# Patient Record
Sex: MALE | Race: WHITE | URBAN - METROPOLITAN AREA
[De-identification: names, ages, dates, MRNs, and addresses within clinical notes are randomized per-mention and may not be internally consistent; named-entity substitution may affect disease eponyms.]

---

## 2020-07-08 ENCOUNTER — APPOINTMENT (OUTPATIENT)
Dept: PHYSICAL THERAPY | Age: 25
End: 2020-07-08
Payer: COMMERCIAL

## 2020-07-22 ENCOUNTER — HOSPITAL ENCOUNTER (OUTPATIENT)
Dept: PHYSICAL THERAPY | Age: 25
Discharge: HOME | End: 2020-07-22
Payer: COMMERCIAL

## 2020-07-22 PROCEDURE — 97530 THERAPEUTIC ACTIVITIES: CPT

## 2020-07-22 PROCEDURE — 97161 PT EVAL LOW COMPLEX 20 MIN: CPT

## 2020-07-22 PROCEDURE — 97110 THERAPEUTIC EXERCISES: CPT

## 2020-07-22 NOTE — PROGRESS NOTES
[facility] [address], [place], [address] [facility]  Phone: [phone]      Fax:  [phone]       Plan of Care/ Statement of Necessity for Physical Therapy Services      Patient name: Whit Villanueva Start of Care: 2020   Referral source: Referred, Self, MD : 1995    Medical Diagnosis: Low back pain [M54.5]   Onset Date:1-2 years ago    Treatment Diagnosis: Thoracic pain    Prior Hospitalization: see medical history Provider#: [id number]   Medications: Verified on Patient summary List    Comorbidities: none as per pt    Prior Level of Function: Increased in pain with prolonged sitting as with work, increased exercise (>3 days per week), overhead lifts      The Plan of Care and following information is based on the information from the initial evaluation. Assessment/ key information:   Pt is a pleasant and motivated 26 y.o. male with C/C of back pain, primarily in thoracic area. Denies any red flags or radicular sx at this time. Pt reports pain insidious in onset, intermittent but ongoing over last 1-2 years. Pt reports getting to a point where decided to address it. Signs/SYmptoms consistent with mechanical back pain. Objective Findings include postural and gait adaptations, decreased ROM, poor oblique, mild scapular dyskinesia, serratus and trunk support and glut and hamstring inhibition. Pt reports most challenged with prolonged sitting as is required for work. Evaluation Complexity History LOW Complexity : Zero comorbidities / personal factors that will impact the outcome / POC; Examination HIGH Complexity : 4+ Standardized tests and measures addressing body structure, function, activity limitation and / or participation in recreation  ;Presentation MEDIUM Complexity : Evolving with changing characteristics  ; Clinical Decision Making MEDIUM Complexity : FOTO score of 26-74  Overall Complexity Rating: LOW   Problem List: pain affecting function, decrease ROM, decrease strength, impaired gait/ balance, decrease ADL/ functional abilitiies, decrease activity tolerance and decrease flexibility/ joint mobility   Treatment Plan may include any combination of the following: Therapeutic exercise, Therapeutic activities, Neuromuscular re-education, Physical agent/modality, Gait/balance training, Manual therapy and Patient education  Patient / Family readiness to learn indicated by: asking questions, trying to perform skills and interest  Persons(s) to be included in education: patient (P)  Barriers to Learning/Limitations: None  Patient Goal (s): To figure out some ways to make my back feel better. I want to be able to work out more.   Patient Self Reported Health Status: good  Rehabilitation Potential: good    Short Term Goals: STG- To be accomplished in 5 visit(s):  1. Pt will be independent with HEP to encourage prophylaxis. Eval:dispensed      Long Term Goals: LTG- To be accomplished in 10 visit(s):  1. Pt will demonstrate ability to forward flex to ground and return to standing with good mechanics and without pain to increase tolerance to daily activities. .  Eval:12 in from ground, right lateralization     2. Pt will improve trunk rotation to 15 in bilaterally in hooklying to indicate mobility needed for gait. Eval:right 19 in            Left: 17 in      3. Pt will report being able to sit at work and work a full day without being disturbed by back pain  Eval:pain frequently during day, reviewed sitting postures at eval      4. Pt will be able to lift overhead with good mechanics and without increased thoracic pain to allow participation in daily and recreational activities. Eval:pain in thoracic spine with MMT of shoulder abduction and scaption     Frequency / Duration: Patient to be seen 10 treatments.     Patient/ Caregiver education and instruction: Diagnosis, prognosis, self care, activity modification and exercises   [x]  Plan of care has been reviewed with LAURA Krueger, PT, DPT 7/22/2020 3:53 PM  _____________________________________________________________________  I certify that the above Therapy Services are being furnished while the patient is under my care. I agree with the treatment plan and certify that this therapy is necessary.     Physician's Signature:____________Date:_________TIME:________    [employer], Date and Time must be completed for valid certification **    Please sign and return to [facility] at the [address], [place], [address] [facility]       Phone: [phone]      Fax:  [phone]

## 2020-07-22 NOTE — PROGRESS NOTES
PT DAILY TREATMENT NOTE    Patient Name: Shola Durbin  Date:2020  : 1995  [x]  Patient  Verified  Payor: [employer] / Plan: Estela Ceja [id number] PPO / Product Type: PPO /    In time:2:40 pm   Out time:3:35 pm   Total Treatment Time (min): 55  Total Timed Codes (min): 30  1:1 Treatment Time ( only): 54   Visit #: 1 of 10    Treatment Area: Low back pain [M54.5]    SUBJECTIVE  Pain Level (0-10 scale): 1-2  Any medication changes, allergies to medications, adverse drug reactions, diagnosis change, or new procedure performed?: [x] No    [] Yes (see summary sheet for update)  Subjective functional status/changes:   [] No changes reported    Hx Present Illness: \"A lot of weirdness in my back for several years. I feel imbalanced. A lot of cracking\"  Denies red flags and bowel and bladder involvement   Increased in pain with prolonged sitting as with work, increased exercise (>3 days per week), overhead lifts   Onset insidious 1-2 years, mild progression of sx    Pain:  _5__/10 max       __0_/10 min     _1-2___/10 now    Location: indicates between shoulder blades and left lower thoracic area as location of pain      [] Sharp    [x] Dull      [] Burning     []  Aching     [] Throbbing      [] Tingling     [x] Other: \"dull,mild, stiff. \"      []  Constant                   [x] Intermittent        Previous treatment:   None     PMHX: PMHx/Surgical Hx:  unremarkable as per pt     Social/Recreation/Work: Work Hx:  for the [employer] - increased sitting and computer use   Living Situation: lives with roommate  Recreational Activities: walking/jogging, drawing, CrossFit (3-5 days a week)     Patient Goal(s): \"To figure out some ways to make my back feel better. I want to be able to work out more. \"    Cognition: A & O x 4      OBJECTIVE    Modalities Rationale:   min [] Estim, type/location:                                      []  att     []  unatt     []  w/US     []  w/ice    []  w/heat    min [] Mechanical Traction: type/lbs                   []  pro   []  sup   []  int   []  cont    []  before manual    []  after manual    min []  Ultrasound, settings/location:      min []  Iontophoresis w/ dexamethasone, location:                                               []  take home patch       []  in clinic    min []  Ice     []  Heat    location/position:     min []  Vasopneumatic Device, press/temp:     min []  Other:    [] Skin assessment post-treatment (if applicable):    []  intact    []  redness- no adverse reaction     []redness  adverse reaction:        25 min [x]Eval                  []Re-Eval       10 min Therapeutic Exercise:  [x] See flow sheet :   Rationale: increase ROM, increase strength, improve coordination and increase proprioception to improve the patients ability to perform daily activities with decreased pain and symptom levels          With   [] TE   [x] TA - 20 min    [] neuro   [] other: Patient Education: [x] Review HEP    [] Progressed/Changed HEP based on:   [] positioning   [] body mechanics   [] transfers   [] heat/ice application    [x] other: pt education regarding exam findings, anatomy involved, MARY approach and purpose, anatomy involved,sitting postures at work     Other Objective/Functional Measures:    Movement/gait:  Glut inhibition, decreased trunk and pelvic rotation, right shoulder lower, decreased arm swing    Visual Inspection: Thoracic: flattened  Lumbar: increased   Shoulder/Scapula: abducted bilaterally, IR, inferior angle tipped bilaterally     Palpation: increased tone in thoracic and lumbar paraspinals                            AROM/PROM Right Left   Standing EXT: WFL     Trunk Rot (hooklying) 19 in  17 in                          Strength Right Left   UQS  Had pinching in back with MMT of abdandscaption  4+ 4+                                Special Tests Right Left   Slump - -   SLR - -   VAL - -   Gaenslen's - -               Other Right Left Other Comments: Standing Forward Flexion 12 in from floor  Gillet: hypomobile on the Left   Scapular Rhythm: mild dyskinesia     MARY Special Tests (pre/post)  HGIR:                                                 Right: 90             Left: 90  Shoulder Flexion   Right: WFL             Left: [facility]  Hip Add Drop Test:                           Right: midline            Left:+  Trunk Rot                                           Right: 19 in  Left 17 in    Shoulder Horizontal Abduction          Right: 45              Left: 21      Apical Ext Test:                                   Right: decreased  Left: decreased  Extension Drop                                   Right : -          Left: -    Pain Level (0-10 scale) post treatment: 0    ASSESSMENT/Changes in Function:   Pt is a pleasant and motivated 26 y.o. male with C/C of back pain, primarily in thoracic area. Denies any red flags or radicular sx at this time. Pt reports pain insidious in onset, intermittent but ongoing over last 1-2 years. Pt reports getting to a point where decided to address it. Signs/SYmptoms consistent with mechanical back pain. Objective Findings include postural and gait adaptations, decreased ROM, poor oblique, mild scapular dyskinesia, serratus and trunk support and glut and hamstring inhibition. Pt reports most challenged with prolonged sitting as is required for work. Patient will continue to benefit from skilled PT services to modify and progress therapeutic interventions, address functional mobility deficits, address ROM deficits, address strength deficits, analyze and address soft tissue restrictions, analyze and cue movement patterns, analyze and modify body mechanics/ergonomics, assess and modify postural abnormalities and instruct in home and community integration to attain remaining goals.      [x]  See Plan of Care  []  See progress note/recertification  []  See Discharge Summary         Progress towards goals / Updated goals:  Short Term Goals: STG- To be accomplished in 5 visit(s):  1. Pt will be independent with HEP to encourage prophylaxis. Eval:dispensed     Long Term Goals: LTG- To be accomplished in 10 visit(s):  1. Pt will demonstrate ability to forward flex to ground and return to standing with good mechanics and without pain to increase tolerance to daily activities. .  Eval:12 in from ground, right lateralization    2. Pt will improve trunk rotation to 15 in bilaterally in hooklying to indicate mobility needed for gait. Eval:right 19 in  Left: 17 in     3. Pt will report being able to sit at work and work a full day without being disturbed by back pain  Eval:pain frequently during day, reviewed sitting postures at eval     4. Pt will be able to lift overhead with good mechanics and without increased thoracic pain to allow participation in daily and recreational activities. Eval:pain in thoracic spine with MMT of shoulder abduction and scaption       PLAN  [x]  Upgrade activities as tolerated     []  Continue plan of care  []  Update interventions per flow sheet       []  Discharge due to:_  []  Other:_      Deirdre Mcdonald PT, DPT 7/22/2020  2:43 PM    No future appointments.

## 2020-07-29 ENCOUNTER — HOSPITAL ENCOUNTER (OUTPATIENT)
Dept: PHYSICAL THERAPY | Age: 25
Discharge: HOME | End: 2020-07-29
Payer: COMMERCIAL

## 2020-07-29 PROCEDURE — 97140 MANUAL THERAPY 1/> REGIONS: CPT

## 2020-07-29 PROCEDURE — 97110 THERAPEUTIC EXERCISES: CPT

## 2020-07-29 PROCEDURE — 97112 NEUROMUSCULAR REEDUCATION: CPT

## 2020-07-29 PROCEDURE — 97530 THERAPEUTIC ACTIVITIES: CPT

## 2020-07-29 NOTE — PROGRESS NOTES
PT DAILY TREATMENT NOTE    Patient Name: Brit Villegas  Date:2020  : 1995  [x]  Patient  Verified  Payor: BLUE [employer]OSS / Plan: Estela Ceja [id number] PPO / Product Type: PPO /    In time: 1:49 pm  Out time:2:40 pm   Total Treatment Time (min): 51  Total Timed Codes (min): 51  1:1 Treatment Time ( only): 51   Visit #: 2 of 10    Treatment Area: Low back pain [M54.5]    SUBJECTIVE  Pain Level (0-10 scale): 0  Any medication changes, allergies to medications, adverse drug reactions, diagnosis change, or new procedure performed?: [x] No    [] Yes (see summary sheet for update)  Subjective functional status/changes:   [] No changes reported  \"I am happy to be here today. \"  Reported minimal to no pain since eval, compliance with HEP. Also stated increased cracking at back but no pain.      OBJECTIVE    Modalities Rationale:        min [] Estim, type/location:                                      []  att     []  unatt     []  w/US     []  w/ice    []  w/heat    min []  Mechanical Traction: type/lbs                   []  pro   []  sup   []  int   []  cont    []  before manual    []  after manual    min []  Ultrasound, settings/location:      min []  Iontophoresis w/ dexamethasone, location:                                               []  take home patch       []  in clinic    min []  Ice     []  Heat    location/position:     min []  Vasopneumatic Device, press/temp:     min []  Other:    [] Skin assessment post-treatment (if applicable):    []  intact    []  redness- no adverse reaction     []redness  adverse reaction:            15 min Therapeutic Activity:  [x]  See flow sheet :   Rationale: increase ROM, increase strength, improve coordination and increase proprioception  to improve the patients ability to perform daily activities with decreased pain and symptom levels     28 min Neuromuscular Re-education:  [x]  See flow sheet :   Rationale: increase ROM, increase strength, improve coordination and increase proprioception  to improve the patients ability to perform daily activities with decreased pain and symptom levels      8 min Manual Therapy:  Manual left posterior hip capsule stretch  Manual over pressure for obturator inhibition in left adductor pull back position. Rationale: decrease pain, increase ROM, increase tissue extensibility and increase postural awareness to improve the patients ability to perform daily activities with decreased pain and symptom levels            With   [] TE   [] TA   [] neuro   [] other: Patient Education: [x] Review HEP    [] Progressed/Changed HEP based on:   [] positioning   [] body mechanics   [] transfers   [] heat/ice application    [] other:      Other Objective/Functional Measures:   MARY Special Tests (pre/post)  HGIR:                                                 Right: 90/90             Left: 90/90  Hip Add Drop Test:                           Right: +/midline           Left:+/midline   Trunk Rot                                           Right: 17 in/16 in Left 17 in /15 in  Shoulder Horizontal Abduction          Right: [facility]             Left: 29 /36          Pain Level (0-10 scale) post treatment: 0    ASSESSMENT/Changes in Function:   Pt reports feeling overall decreased pain since evaluation. Has worked out twice since then with no pain. Reported relief with driving when performing that anterior right hip shift. Very challenged with adductor pull back and left pec inhibition. Need to address left abs and work on left posterior hip capsule inhibition.     Patient will continue to benefit from skilled PT services to modify and progress therapeutic interventions, address functional mobility deficits, address ROM deficits, address strength deficits, analyze and address soft tissue restrictions, analyze and cue movement patterns, analyze and modify body mechanics/ergonomics, assess and modify postural abnormalities and instruct in home and community integration to attain remaining goals. []  See Plan of Care  []  See progress note/recertification  []  See Discharge Summary         Progress towards goals / Updated goals:  1.  Pt will be independent with HEP to encourage prophylaxis. Eval:dispensed  Current: Progressing 7/29/20 reports compliance and reviewed with pt this session       Long Term Goals: LTG- To be accomplished in 10 visit(s):  1.  Pt will demonstrate ability to forward flex to ground and return to standing with good mechanics and without pain to increase tolerance to daily activities. .  Eval:12 in from ground, right lateralization     2.  Pt will improve trunk rotation to 15 in bilaterally in hooklying to indicate mobility needed for gait. Eval:right 19 in            Left: 17 in   Progressing: improved at end of session Right: 16 in and left 15 in      3.  Pt will report being able to sit at work and work a full day without being disturbed by back pain  Eval:pain frequently during day, reviewed sitting postures at eval      4.  Pt will be able to lift overhead with good mechanics and without increased thoracic pain to allow participation in daily and recreational activities.   Eval:pain in thoracic spine with MMT of shoulder abduction and scaption        PLAN  [x]  Upgrade activities as tolerated     []  Continue plan of care  []  Update interventions per flow sheet       []  Discharge due to:_  []  Other:_      Lashanda Cortes, PT, DPT 7/29/2020  1:54 PM    Future Appointments   Date Time Provider [place]   7/31/2020 10:15 AM Monique James PTA [facility]   8/3/2020  4:00 PM Shelly Morton PT, DPT [facility]   8/5/2020  5:30 PM [facility] James [facility]   8/10/2020  4:00 PM Shelly Morton PT, DPT [facility]   8/12/2020  5:30 PM Monique James PTA [facility]   8/17/2020  4:00 PM Shelly Morton, PT, DPT [facility]   8/19/2020  5:30 PM Monique James PTA [facility]   8/24/2020  4:00 PM Shelly Morton, PT, DPT [facility] 8/26/2020  5:30 PM Shahzad POLLARD [facility]   8/31/2020  4:00 PM Stephanie Lima PT, DPT [facility]

## 2020-07-31 ENCOUNTER — HOSPITAL ENCOUNTER (OUTPATIENT)
Dept: PHYSICAL THERAPY | Age: 25
Discharge: HOME | End: 2020-07-31
Payer: COMMERCIAL

## 2020-07-31 PROCEDURE — 97530 THERAPEUTIC ACTIVITIES: CPT

## 2020-07-31 PROCEDURE — 97112 NEUROMUSCULAR REEDUCATION: CPT

## 2020-07-31 PROCEDURE — 97140 MANUAL THERAPY 1/> REGIONS: CPT

## 2020-07-31 NOTE — PROGRESS NOTES
PT DAILY TREATMENT NOTE    Patient Name: Shola Durbin  Date:2020  : 1995  [x]  Patient  Verified  Payor: [employer]UE [employer] / Plan: Estela Ceja [id number] PPO / Product Type: PPO /    In time:10:26 am  Out time:11:15 am  Total Treatment Time (min): 49  Total Timed Codes (min): 49  1:1 Treatment Time ( only): 52   Visit #: 3 of 10    Treatment Area: Low back pain [M54.5]    SUBJECTIVE  Pain Level (0-10 scale): 3  Any medication changes, allergies to medications, adverse drug reactions, diagnosis change, or new procedure performed?: [x] No    [] Yes (see summary sheet for update)  Subjective functional status/changes:   [] No changes reported  \"I ok. A little sore through here (indicated mid back). \"    OBJECTIVE    Modalities Rationale:      min [] Estim, type/location:                                      []  att     []  unatt     []  w/US     []  w/ice    []  w/heat    min []  Mechanical Traction: type/lbs                   []  pro   []  sup   []  int   []  cont    []  before manual    []  after manual    min []  Ultrasound, settings/location:      min []  Iontophoresis w/ dexamethasone, location:                                               []  take home patch       []  in clinic    min []  Ice     []  Heat    location/position:     min []  Vasopneumatic Device, press/temp:     min []  Other:    [] Skin assessment post-treatment (if applicable):    []  intact    []  redness- no adverse reaction     []redness  adverse reaction:          10 min Therapeutic Activity:  [x]  See flow sheet :   Rationale: increase ROM, increase strength, improve coordination and increase proprioception  to improve the patients ability to perform daily activities with decreased pain and symptom levels       24 min Neuromuscular Re-education:  [x]  See flow sheet : max cues for hamstring and oblique faciliation    Rationale: increase ROM, increase strength, improve coordination and increase proprioception  to improve the patients ability to perform daily activities with decreased pain and symptom levels    15 min Manual Therapy:  MARY AIC correction, Sternal mobilization with active pelvic tilt, Superior T4 (MARY technique), Right subclavius release (MARY technique), MARY infraclavicular rib pump with active breath   Obtained consent for hand placement      Rationale: decrease pain, increase ROM and increase tissue extensibility to improve the patients ability to perform daily activities with decreased pain and symptom levels            With   [] TE   [] TA   [] neuro   [] other: Patient Education: [x] Review HEP    [] Progressed/Changed HEP based on:   [] positioning   [] body mechanics   [] transfers   [] heat/ice application    [] other:      Other Objective/Functional Measures:   MARY Special Tests (pre/post)  HGIR:                                                 Right: 85/90             Left: 90/90  Hip Add Drop Test:                           Right: +/-            Left:+/-  Trunk Rot                                           Right: 16 in/15 in  Left 16 in / 15 in   Shoulder Horizontal Abduction          Right: [facility] /NT            Left: 30 /NT    Standing Forward Flexion: to toes       Pain Level (0-10 scale) post treatment: 0    ASSESSMENT/Changes in Function:   Very challenged with hamstring facilitation and left posterior hip capsule inhibition. Responded well to standing IO/TA with max cues and after standing IO/TA was able to inhibit left post hip capsule in standing. Updated HEP. When pt asked about gym - advised no back squatting, avoid painful activities and to perform HEP pre and post work out.      Patient will continue to benefit from skilled PT services to modify and progress therapeutic interventions, address functional mobility deficits, address ROM deficits, address strength deficits, analyze and address soft tissue restrictions, analyze and cue movement patterns, analyze and modify body mechanics/ergonomics, assess and modify postural abnormalities and instruct in home and community integration to attain remaining goals. []  See Plan of Care  []  See progress note/recertification  []  See Discharge Summary         Progress towards goals / Updated goals:  1.  Pt will be independent with HEP to encourage prophylaxis. Eval:dispensed  Current: Progressing 7/29/20 reports compliance and reviewed with pt this session       Long Term Goals: LTG- To be accomplished in 10 visit(s):  1.  Pt will demonstrate ability to forward flex to ground and return to standing with good mechanics and without pain to increase tolerance to daily activities. .  Eval:12 in from ground, right lateralization  Current: Progressing 7/31/20 to toes     2.  Pt will improve trunk rotation to 15 in bilaterally in hooklying to indicate mobility needed for gait. Eval:right 19 in            Left: 17 in   Progressing: improved at end of session Right: 16 in and left 15 in      3.  Pt will report being able to sit at work and work a full day without being disturbed by back pain  Eval:pain frequently during day, reviewed sitting postures at eval      4.  Pt will be able to lift overhead with good mechanics and without increased thoracic pain to allow participation in daily and recreational activities.   Eval:pain in thoracic spine with MMT of shoulder abduction and scaption        PLAN  []  Upgrade activities as tolerated     []  Continue plan of care  []  Update interventions per flow sheet       []  Discharge due to:_  []  Other:_      Kingsley Siu, PT, DPT 7/31/2020  10:31 AM    Future Appointments   Date Time Provider Satnley Jones   8/3/2020  4:00 PM Buffy Zacarias, PT, DPT [facility]   8/5/2020  5:30 PM Bentley Scanlon, PTA [facility]   8/10/2020  4:00 PM Buffy Sera, PT, DPT [facility]   8/12/2020  5:30 PM Bentley Scanlon PTA [facility]   8/17/2020  4:00 PM Buffy Zacarias, PT, DPT [facility]   8/19/2020  5:30 PM Ritchie Espinoza PTA [facility]   8/24/2020  4:00 PM Aurelio Mims PT, STANISLAVT [facility]   8/26/2020  5:30 [facility]   8/31/2020  4:00 PM Aurelio Mims PT, DPT ATUL [facility]

## 2020-08-03 ENCOUNTER — HOSPITAL ENCOUNTER (OUTPATIENT)
Dept: PHYSICAL THERAPY | Age: 25
Discharge: HOME | End: 2020-08-03
Payer: COMMERCIAL

## 2020-08-03 PROCEDURE — 97530 THERAPEUTIC ACTIVITIES: CPT

## 2020-08-03 PROCEDURE — 97112 NEUROMUSCULAR REEDUCATION: CPT

## 2020-08-03 NOTE — PROGRESS NOTES
PT DAILY TREATMENT NOTE    Patient Name: Jaskaran Rey  Date:8/3/2020  : 1995  [x]  Patient  Verified  Payor: Cayden Nielsen / Plan: Estela Ceja [id number] PPO / Product Type: PPO /    In time:4:06 pm  Out time:4:48 pm   Total Treatment Time (min): 42  Total Timed Codes (min): 42  1:1 Treatment Time ( only): 42   Visit #: 4 of 10    Treatment Area: Low back pain [M54.5]    SUBJECTIVE  Pain Level (0-10 scale): 0  Any medication changes, allergies to medications, adverse drug reactions, diagnosis change, or new procedure performed?: [x] No    [] Yes (see summary sheet for update)  Subjective functional status/changes:   [] No changes reported  \"Pretty freaking good actually. \"    OBJECTIVE    Modalities Rationale:     min [] Estim, type/location:                                      []  att     []  unatt     []  w/US     []  w/ice    []  w/heat    min []  Mechanical Traction: type/lbs                   []  pro   []  sup   []  int   []  cont    []  before manual    []  after manual    min []  Ultrasound, settings/location:      min []  Iontophoresis w/ dexamethasone, location:                                               []  take home patch       []  in clinic    min []  Ice     []  Heat    location/position:     min []  Vasopneumatic Device, press/temp:     min []  Other:    [] Skin assessment post-treatment (if applicable):    []  intact    []  redness- no adverse reaction     []redness  adverse reaction:          15 min Therapeutic Activity:  [x]  See flow sheet :   Rationale: increase ROM, increase strength, improve coordination and increase proprioception  to improve the patients ability to perform daily activities with decreased pain and symptom levels       27 min Neuromuscular Re-education:  [x]  See flow sheet :faciliation of abdominals, use of balloon for diaphragmatic breathing   Rationale: increase ROM, increase strength, improve coordination and increase proprioception  to improve the patients ability to perform daily activities with decreased pain and symptom levels           With   [] TE   [] TA   [] neuro   [] other: Patient Education: [x] Review HEP    [] Progressed/Changed HEP based on:   [] positioning   [] body mechanics   [] transfers   [] heat/ice application    [] other:      Other Objective/Functional Measures:   MARY Special Tests (pre/post)  HGIR:                                                 Right: 90/90             Left: 90/90  Hip Add Drop Test:                           Right: midline/-            Left:+/-  Trunk Rot                                           Right: 16 in/15 in Left 16 in /14 in  Shoulder Horizontal Abduction          Right: [facility] /NT            Left: 35 /45    Forward flexion to dorsum of feet    Noted imporved arm swing with gait but continued glut inhibition     Pain Level (0-10 scale) post treatment: 0    ASSESSMENT/Changes in Function:   Pt repositioned very nicely this session. Improved ability to recruit hamstrings in 90-90s. Reported oblique fatigue. Patient will continue to benefit from skilled PT services to modify and progress therapeutic interventions, address functional mobility deficits, address ROM deficits, address strength deficits, analyze and address soft tissue restrictions, analyze and cue movement patterns, analyze and modify body mechanics/ergonomics, assess and modify postural abnormalities and instruct in home and community integration to attain remaining goals. []  See Plan of Care  []  See progress note/recertification  []  See Discharge Summary         Progress towards goals / Updated goals:  1.  Pt will be independent with HEP to encourage prophylaxis.   Eval:dispensed  Current:MET 8//3/20 per pt report, updated this session      Long Term Goals: LTG- To be accomplished in 10 visit(s):  1.  Pt will demonstrate ability to forward flex to ground and return to standing with good mechanics and without pain to increase tolerance to daily activities. .  Eval:12 in from ground, right lateralization  Current: Progressing 7/31/20 to toes     2.  Pt will improve trunk rotation to 15 in bilaterally in hooklying to indicate mobility needed for gait. Eval:right 19 in            Left: 17 in   Current: Progressing 8/3/20 improved at end of session Right: 15 in and left 14 in      3.  Pt will report being able to sit at work and work a full day without being disturbed by back pain  Eval:pain frequently during day, reviewed sitting postures at eval      4.  Pt will be able to lift overhead with good mechanics and without increased thoracic pain to allow participation in daily and recreational activities.   Eval:pain in thoracic spine with MMT of shoulder abduction and scaption     PLAN  [x]  Upgrade activities as tolerated     []  Continue plan of care  []  Update interventions per flow sheet       []  Discharge due to:_  []  Other:_      Kem Herrera PT, DPT 8/3/2020  4:13 PM    Future Appointments   Date Time Provider Stanley Jones   8/5/2020  5:30 PM Chloé Oquendo [facility]   8/10/2020  4:00 PM Luz Son PT, DPT [facility]   8/12/2020  5:30 PM Michela Suresh PTA [facility]   8/17/2020  4:00 PM Luz Son PT, DPT [facility]   8/19/2020  5:30 PM Michela Suresh PTA [facility]   8/24/2020  4:00 PM Luz Son PT, DPT [facility]   8/26/2020  5:30 PM Michela Suresh PTA [facility]   8/31/2020  4:00 PM Luz Son PT, DPT [facility]

## 2020-08-05 ENCOUNTER — HOSPITAL ENCOUNTER (OUTPATIENT)
Dept: PHYSICAL THERAPY | Age: 25
Discharge: HOME | End: 2020-08-05
Payer: COMMERCIAL

## 2020-08-05 PROCEDURE — 97140 MANUAL THERAPY 1/> REGIONS: CPT

## 2020-08-05 PROCEDURE — 97530 THERAPEUTIC ACTIVITIES: CPT

## 2020-08-05 PROCEDURE — 97112 NEUROMUSCULAR REEDUCATION: CPT

## 2020-08-05 NOTE — PROGRESS NOTES
PT DAILY TREATMENT NOTE    Patient Name: Darleen Renteria  Date:2020  : 1995  [x]  Patient  Verified  Payor: BLUE [employer] / Plan: Estela Ceja [id number] PPO / Product Type: PPO /    In time:5:30  Out time:6:30  Total Treatment Time (min): 60  Total Timed Codes (min): 60  1:1 Treatment Time (MC only): 60   Visit #: 5 of 10    Treatment Area: Low back pain [M54.5]    SUBJECTIVE  Pain Level (0-10 scale): 3  Any medication changes, allergies to medications, adverse drug reactions, diagnosis change, or new procedure performed?: [x] No    [] Yes (see summary sheet for update)  Subjective functional status/changes:   [] No changes reported  \"I am actually having pain in the side of my back and up into my neck.  \"     OBJECTIVE      10 min Therapeutic Activity:  [x]  See flow sheet :   Rationale: increase strength, improve coordination, improve balance and increase proprioception  to improve the patients ability to perform pain free  ADL's      30 min Neuromuscular Re-education:  [x]  See flow sheet :   Rationale: improve coordination, improve balance and increase proprioception  to improve the patients ability to perform pain free ADL's     10 min Manual Therapy:  Manual rib pumping bilateral  STM to right upper trap    Rationale: decrease pain, increase ROM and increase tissue extensibility to improve the patients ability to perform pain free ADL's           With   [x] TE   [x] TA   [] neuro   [] other: Patient Education: [x] Review HEP    [] Progressed/Changed HEP based on:   [] positioning   [] body mechanics   [] transfers   [] heat/ice application    [] other:      Other Objective/Functional Measures: MARY Special Tests (pre/post)  HGIR:                                                 Right: 90/90             Left: 90/90  Shoulder Flexion   Right: 150/             Left: 180/\  Trunk Rot                                           Right: 16/    Left 15 /  Shoulder Horizontal Abduction          Right: 45 / Left: 40 /     Pain Level (0-10 scale) post treatment: 0    ASSESSMENT/Changes in Function:  Pt reported increased right sided thoracic and neck pain after overhead pressing at the gym. Noted decreased right shoulder flexion. Responded well to left stance with right reach and lady in glasses with reach to facilitate left obliques. Pt was able to feel left posterior capsule stretch  at step and left hamstring in 90/90. Patient will continue to benefit from skilled PT services to modify and progress therapeutic interventions, address functional mobility deficits, address ROM deficits, address strength deficits, analyze and address soft tissue restrictions, analyze and cue movement patterns, analyze and modify body mechanics/ergonomics, assess and modify postural abnormalities, address imbalance/dizziness and instruct in home and community integration to attain remaining goals. []  See Plan of Care  []  See progress note/recertification  []  See Discharge Summary         Progress towards goals / Updated goals:  1.  Pt will be independent with HEP to encourage prophylaxis. Eval:dispensed  Current:MET 8//3/20 per pt report, updated this session      Long Term Goals: LTG- To be accomplished in 10 visit(s):  1.  Pt will demonstrate ability to forward flex to ground and return to standing with good mechanics and without pain to increase tolerance to daily activities. .  Eval:12 in from ground, right lateralization  Current: Progressing 7/31/20 to toes     2.  Pt will improve trunk rotation to 15 in bilaterally in hooklying to indicate mobility needed for gait.   Eval:right 19 in            Left: 17 in   Current: Progressing 8/3/20 improved at end of session Right: 15 in and left 14 in      3.  Pt will report being able to sit at work and work a full day without being disturbed by back pain  Eval:pain frequently during day, reviewed sitting postures at eval      4.  Pt will be able to lift overhead with good mechanics and without increased thoracic pain to allow participation in daily and recreational activities.   Eval:pain in thoracic spine with MMT of shoulder abduction and scaption   Current: pt reported increased right sided pain with overhead lifting: progressing 8/5/20         PLAN  [x]  Upgrade activities as tolerated     [x]  Continue plan of care  []  Update interventions per flow sheet       []  Discharge due to:_  []  Other:_      Maira Pearce PTA 8/5/2020  5:29 PM    Future Appointments   Date Time Provider Stanley Jones   8/5/2020  5:30 PM Shahzad Cook [facility]   8/10/2020  4:00 PM Lum [place], PT, DPT [facility]   8/12/2020  5:30 PM Schuyler Roche PTA [facility]   8/17/2020  4:00 PM Lum Janie, PT, DPT [facility]   8/19/2020  5:30 PM Schuyler Roche PTA [facility]   8/24/2020  4:00 PM Lum [place], PT, DPT [facility]   8/26/2020  5:30 PM Schuyler Roche PTA [facility]   8/31/2020  4:00 PM Lum Janie, PT, DPT [facility]

## 2020-08-10 ENCOUNTER — HOSPITAL ENCOUNTER (OUTPATIENT)
Dept: PHYSICAL THERAPY | Age: 25
Discharge: HOME | End: 2020-08-10
Payer: COMMERCIAL

## 2020-08-10 PROCEDURE — 97140 MANUAL THERAPY 1/> REGIONS: CPT

## 2020-08-10 PROCEDURE — 97530 THERAPEUTIC ACTIVITIES: CPT

## 2020-08-10 PROCEDURE — 97112 NEUROMUSCULAR REEDUCATION: CPT

## 2020-08-10 NOTE — PROGRESS NOTES
PT DAILY TREATMENT NOTE    Patient Name: Yosi Martins  Date:8/10/2020  : 1995  [x]  Patient  Verified  Payor: Sy Mancera / Plan: Estela Ceja [id number] PPO / Product Type: PPO /    In time:4:13 pm  Out time:5:05 pm  Total Treatment Time (min): 52  Total Timed Codes (min): 52  1:1 Treatment Time ( only): 52   Visit #: 6 of 10    Treatment Area: Low back pain [M54.5]    SUBJECTIVE  Pain Level (0-10 scale): 2  Any medication changes, allergies to medications, adverse drug reactions, diagnosis change, or new procedure performed?: [x] No    [] Yes (see summary sheet for update)  Subjective functional status/changes:   [] No changes reported  \"I just have so much tightness. \"  Increased tightness right QL and left mid thoracic region - lifted 2 times since last session    OBJECTIVE    Modalities Rationale:      min [] Estim, type/location:                                      []  att     []  unatt     []  w/US     []  w/ice    []  w/heat    min []  Mechanical Traction: type/lbs                   []  pro   []  sup   []  int   []  cont    []  before manual    []  after manual    min []  Ultrasound, settings/location:      min []  Iontophoresis w/ dexamethasone, location:                                               []  take home patch       []  in clinic    min []  Ice     []  Heat    location/position:     min []  Vasopneumatic Device, press/temp:     min []  Other:    [] Skin assessment post-treatment (if applicable):    []  intact    []  redness- no adverse reaction     []redness  adverse reaction:          12 min Therapeutic Activity:  [x]  See flow sheet :   Rationale: increase ROM, increase strength, improve coordination and increase proprioception  to improve the patients ability to perform daily activities with decreased pain and symptom levels       25 min Neuromuscular Re-education:  [x]  See flow sheet :   Rationale: increase ROM, increase strength, improve coordination and increase proprioception  to improve the patients ability to perform daily activities with decreased pain and symptom levels      15 min Manual Therapy:  MARY AIC correction, Sternal mobilization with active pelvic tilt, Superior T4 (MARY technique), MARY infraclavicular rib pump with active breath   Manual stretching right QL in left S/L with iliac depression   Obtained consent for hand placement      Rationale: decrease pain, increase ROM, increase tissue extensibility and increase postural awareness to improve the patients ability to perform daily activities with decreased pain and symptom levels            With   [] TE   [] TA   [] neuro   [] other: Patient Education: [x] Review HEP    [] Progressed/Changed HEP based on:   [] positioning   [] body mechanics   [] transfers   [] heat/ice application    [] other:      Other Objective/Functional Measures:   MARY Special Tests (pre/post)  HGIR:                                                 Right: 85/90             Left: WFL  Shoulder Flexion   Right: NT/160             Left: NT/175  Hip Add Drop Test:                           Right: +/-            Left:+/-  Trunk Rot                                           Right: 17 in /15.5 in Left 16in /15 in   Shoulder Horizontal Abduction          Right: [facility]              Left: 34 /40     Pain Level (0-10 scale) post treatment: 0    ASSESSMENT/Changes in Function:   Reviewed grounding with weight room activities such as deadlifts, Kettle bell swings and push press. Reviewed thoracic positioning so not driving up with back extensors. Continues to be challenged with quad inhibition but able to with max tactile cues. Able to engage left abs with \"lady in glasses\" position. Responded nicely to lat hang and T8 extension.      Patient will continue to benefit from skilled PT services to modify and progress therapeutic interventions, address functional mobility deficits, address ROM deficits, address strength deficits, analyze and address soft tissue restrictions, analyze and cue movement patterns, analyze and modify body mechanics/ergonomics, assess and modify postural abnormalities and instruct in home and community integration to attain remaining goals. []  See Plan of Care  []  See progress note/recertification  []  See Discharge Summary         Progress towards goals / Updated goals:  1.  Pt will be independent with HEP to encourage prophylaxis. Eval:dispensed  Current:MET 8//3/20 per pt report, updated this session      Long Term Goals: LTG- To be accomplished in 10 visit(s):  1.  Pt will demonstrate ability to forward flex to ground and return to standing with good mechanics and without pain to increase tolerance to daily activities. .  Eval:12 in from ground, right lateralization  Current: Progressing 7/31/20 to toes     2.  Pt will improve trunk rotation to 15 in bilaterally in hooklying to indicate mobility needed for gait. Eval:right 19 in            Left: 16 in   Current: Progressing 8/3/20 improved at end of session Right: 15 in and left 14 in      3.  Pt will report being able to sit at work and work a full day without being disturbed by back pain  Eval:pain frequently during day, reviewed sitting postures at eval      4.  Pt will be able to lift overhead with good mechanics and without increased thoracic pain to allow participation in daily and recreational activities.   Eval:pain in thoracic spine with MMT of shoulder abduction and scaption   Current:Progressing 8/10/20 - reviewed techniques for gym       PLAN  [x]  Upgrade activities as tolerated     []  Continue plan of care  []  Update interventions per flow sheet       []  Discharge due to:_  []  Other:_      Betina Malone, PT, DPT 8/10/2020  4:12 PM    Future Appointments   Date Time Provider Stanley Jones   8/12/2020  5:30 PM Cricket Meza [facility]   8/17/2020  4:00 PM Kashif Wheatley, PT, DPT [facility]   8/19/2020  5:30 PM Miriam Ortega PTA [facility]   8/24/2020 4:00 PM Ar Wen PT[facility]   8/26/2020  5:30 PM [facility]   8/31/2020  4:00 PM Ar Wen PT, STANISLAVT ATUL [facility]

## 2020-08-12 ENCOUNTER — HOSPITAL ENCOUNTER (OUTPATIENT)
Dept: PHYSICAL THERAPY | Age: 25
Discharge: HOME | End: 2020-08-12
Payer: COMMERCIAL

## 2020-08-12 PROCEDURE — 97112 NEUROMUSCULAR REEDUCATION: CPT

## 2020-08-12 PROCEDURE — 97140 MANUAL THERAPY 1/> REGIONS: CPT

## 2020-08-12 PROCEDURE — 97530 THERAPEUTIC ACTIVITIES: CPT

## 2020-08-12 NOTE — PROGRESS NOTES
PT DAILY TREATMENT NOTE    Patient Name: Ivan Moreira  Date:2020  : 1995  [x]  Patient  Verified  Payor: BLUE CROSS / Plan: [employer] [id number] PPO / Product Type: PPO /    In time:5:35  Out time:6:30  Total Treatment Time (min): 55  Total Timed Codes (min): 55  1:1 Treatment Time ( only): 55   Visit #: 7 of 10    Treatment Area: Low back pain [M54.5]    SUBJECTIVE  Pain Level (0-10 scale): 1  Any medication changes, allergies to medications, adverse drug reactions, diagnosis change, or new procedure performed?: [x] No    [] Yes (see summary sheet for update)  Subjective functional status/changes:   [] No changes reported  \"my right side of my back feels tight like it needs to pop. \"     OBJECTIVE      15 min Therapeutic Activity:  []  See flow sheet :   Rationale: increase ROM, increase strength, improve coordination and improve balance  to improve the patients ability to perform pain free ADL\"s      32 min Neuromuscular Re-education:  []  See flow sheet :   Rationale: increase ROM, increase strength, improve coordination and improve balance  to improve the patients ability to perform pain free ADL's     8 min Manual Therapy:  One  person rib pumping    Rationale: decrease pain, increase ROM and increase tissue extensibility to improve the patients ability to perform pain free ADL's     With   [] TE   [x] TA   [] neuro   [] other: Patient Education: [x] Review HEP    [] Progressed/Changed HEP based on:   [] positioning   [] body mechanics   [] transfers   [] heat/ice application    [] other:      Other Objective/Functional Measures:      MARY Special Tests (pre/post)  HGIR:                                                 Right: 90/90             Left: 90/90  Trunk Rot                                           Right: 16/15    Left 15 /14  Shoulder Horizontal Abduction          Right: 45 /45             Left: 40 /45      Pain Level (0-10 scale) post treatment:0/10     ASSESSMENT/Changes in Function:   Pt reported increased pain after overhead lifting of thrusters at the gym. Pain persists in right upper back, scapular area. Pt is challenged with right trunk rotation . Challenged with mermaid positional breathing. Patient will continue to benefit from skilled PT services to modify and progress therapeutic interventions, address functional mobility deficits, address ROM deficits, address strength deficits, analyze and address soft tissue restrictions, analyze and cue movement patterns, analyze and modify body mechanics/ergonomics, assess and modify postural abnormalities, address imbalance/dizziness and instruct in home and community integration to attain remaining goals. []  See Plan of Care  []  See progress note/recertification  []  See Discharge Summary         Progress towards goals / Updated goals:  1.  Pt will be independent with HEP to encourage prophylaxis. Eval:dispensed  Current:MET 8//3/20 per pt report, updated this session      Long Term Goals: LTG- To be accomplished in 10 visit(s):  1.  Pt will demonstrate ability to forward flex to ground and return to standing with good mechanics and without pain to increase tolerance to daily activities. .  Eval:12 in from ground, right lateralization  Current: Progressing 7/31/20 to toes     2.  Pt will improve trunk rotation to 15 in bilaterally in hooklying to indicate mobility needed for gait. Eval:right 19 in            Left: 17 in   Current:  right trunk rotation 16 inches at start of treatment. 8/12/20     3.  Pt will report being able to sit at work and work a full day without being disturbed by back pain  Eval:pain frequently during day, reviewed sitting postures at eval      4.  Pt will be able to lift overhead with good mechanics and without increased thoracic pain to allow participation in daily and recreational activities.   Eval:pain in thoracic spine with MMT of shoulder abduction and scaption   Current:Progressing 8/10/20 - reviewed techniques for gym          PLAN  [x]  Upgrade activities as tolerated     [x]  Continue plan of care  []  Update interventions per flow sheet       []  Discharge due to:_  []  Other:_      May LAURA Lewis 8/12/2020  5:36 PM    Future Appointments   Date Time Provider Stanley Jones   8/17/2020  4:00 PM Joey Ortiz, PT, DPT [facility]   8/19/2020  5:30 PM Sam Loco PTA [facility]   8/24/2020  4:00 PM Joey Ortiz PT, DPT [facility]   8/26/2020  5:30 PM Sam Loco PTA [facility]   8/31/2020  4:00 PM Joey Ortiz PT, DPT [facility]

## 2020-08-17 ENCOUNTER — HOSPITAL ENCOUNTER (OUTPATIENT)
Dept: PHYSICAL THERAPY | Age: 25
Discharge: HOME | End: 2020-08-17
Payer: COMMERCIAL

## 2020-08-17 PROCEDURE — 97530 THERAPEUTIC ACTIVITIES: CPT

## 2020-08-17 PROCEDURE — 97140 MANUAL THERAPY 1/> REGIONS: CPT

## 2020-08-17 PROCEDURE — 97112 NEUROMUSCULAR REEDUCATION: CPT

## 2020-08-17 NOTE — PROGRESS NOTES
PT DAILY TREATMENT NOTE    Patient Name: Shola Durbin  Date:2020  : 1995  [x]  Patient  Verified  Payor: BLUE CROSS / Plan: [facility] PPO / Product Type: PPO /    In time:4:05 pm  Out time:4:57 pm   Total Treatment Time (min): 52  Total Timed Codes (min): 52  Visit #: 8 of 10    Treatment Area: Low back pain [M54.5]    SUBJECTIVE  Pain Level (0-10 scale): 0-1  Any medication changes, allergies to medications, adverse drug reactions, diagnosis change, or new procedure performed?: [x] No    [] Yes (see summary sheet for update)  Subjective functional status/changes:   [] No changes reported  \"I am pretty good. I have some tightness in my neck. \"    OBJECTIVE    Modalities Rationale:     min [] Estim, type/location:                                      []  att     []  unatt     []  w/US     []  w/ice    []  w/heat    min []  Mechanical Traction: type/lbs                   []  pro   []  sup   []  int   []  cont    []  before manual    []  after manual    min []  Ultrasound, settings/location:      min []  Iontophoresis w/ dexamethasone, location:                                               []  take home patch       []  in clinic    min []  Ice     []  Heat    location/position:     min []  Vasopneumatic Device, press/temp:     min []  Other:    [] Skin assessment post-treatment (if applicable):    []  intact    []  redness- no adverse reaction     []redness  adverse reaction:          12 min Therapeutic Activity:  [x]  See flow sheet :   Rationale: increase ROM, increase strength, improve coordination and increase proprioception  to improve the patients ability to perform daily activities with decreased pain and symptom levels       20 min Neuromuscular Re-education:  [x]  See flow sheet :   Rationale: increase ROM, increase strength, improve coordination and increase proprioception  to improve the patients ability to perform daily activities with decreased pain and symptom levels      20 min Manual Therapy:  Vacuum cupping to UT, thoracic paraspinals in seated prayer position   MARY AIC correction, Sternal mobilization with active pelvic tilt, Superior T4 (MARY technique), Right subclavius release (MARY technique), MARY infraclavicular rib pump with active breath   Obtained consent for hand placement      Rationale: decrease pain, increase ROM and increase tissue extensibility to improve the patients ability to perform daily activities with decreased pain and symptom levels            With   [] TE   [] TA   [] neuro   [] other: Patient Education: [x] Review HEP    [] Progressed/Changed HEP based on:   [] positioning   [] body mechanics   [] transfers   [] heat/ice application    [] other:      Other Objective/Functional Measures:   MARY Special Tests (pre/post)  HGIR:                                                 Right: 85/90             Left: 90/90  Shoulder Flexion   Right: NT/145             Left: NT/148  Hip Add Drop Test:                           Right: +/-            Left:+/-  Trunk Rot                                           Right: 16 in/15 in  Left 16 in /15 in  Shoulder Horizontal Abduction          Right: 35 /40             Left: 29 /40     Apical Ext Test:                                   Right: dcr/improved            Left: dcr/WFL       Pain Level (0-10 scale) post treatment: 0    ASSESSMENT/Changes in Function:   Visible fatigue with T8 extension. Responded best to oblique faciliation and anterior neck inhibition.  May try S/L hip lift next session     Patient will continue to benefit from skilled PT services to modify and progress therapeutic interventions, address functional mobility deficits, address ROM deficits, address strength deficits, analyze and address soft tissue restrictions, analyze and cue movement patterns, analyze and modify body mechanics/ergonomics, assess and modify postural abnormalities and instruct in home and community integration to attain remaining goals. []  See Plan of Care  []  See progress note/recertification  []  See Discharge Summary         Progress towards goals / Updated goals:  1.  Pt will be independent with HEP to encourage prophylaxis. Eval:dispensed  Current:MET 8//3/20 per pt report, updated this session      Long Term Goals: LTG- To be accomplished in 10 visit(s):  1.  Pt will demonstrate ability to forward flex to ground and return to standing with good mechanics and without pain to increase tolerance to daily activities. .  Eval:12 in from ground, right lateralization  Current: Progressing 7/31/20 to toes     2.  Pt will improve trunk rotation to 15 in bilaterally in hooklying to indicate mobility needed for gait. Eval:right 19 in            Left: 16 in   Current:  progressing 8/17/20 15 in bilaterally at end of session      3.  Pt will report being able to sit at work and work a full day without being disturbed by back pain  Eval:pain frequently during day, reviewed sitting postures at eval      4.  Pt will be able to lift overhead with good mechanics and without increased thoracic pain to allow participation in daily and recreational activities.   Eval:pain in thoracic spine with MMT of shoulder abduction and scaption   Current:Progressing 8/17/20 - reviewed techniques for gym continues to be challenged with overhead press       PLAN  [x]  Upgrade activities as tolerated     []  Continue plan of care  []  Update interventions per flow sheet       []  Discharge due to:_  []  Other:_      Vladimir Kirkland, PT, DPT 8/17/2020  4:17 PM    Future Appointments   Date Time Provider Stanley Jones   8/19/2020  5:30 PM Meg Oates [facility]   8/24/2020  4:00 PM Umm Gtz, PT, DPT [facility]   8/26/2020  5:30 PM Jose Ramon Patel PTA [facility]   8/31/2020  4:00 PM Umm Gtz, PT, DPT [facility]

## 2020-08-19 ENCOUNTER — HOSPITAL ENCOUNTER (OUTPATIENT)
Dept: PHYSICAL THERAPY | Age: 25
Discharge: HOME | End: 2020-08-19
Payer: COMMERCIAL

## 2020-08-19 PROCEDURE — 97112 NEUROMUSCULAR REEDUCATION: CPT

## 2020-08-19 PROCEDURE — 97530 THERAPEUTIC ACTIVITIES: CPT

## 2020-08-19 PROCEDURE — 97140 MANUAL THERAPY 1/> REGIONS: CPT

## 2020-08-19 NOTE — PROGRESS NOTES
PT DAILY TREATMENT NOTE    Patient Name: Alta Holbrook  Date:2020  : 1995  [x]  Patient  Verified  Payor: [employer] / Plan: [employer] [id number] PPO / Product Type: PPO /    In time:5:00  Out time:5:50  Total Treatment Time (min): 50  Total Timed Codes (min): 50  1:1 Treatment Time (MC only): 50   Visit #: 9 of 10    Treatment Area: Low back pain [M54.5]    SUBJECTIVE  Pain Level (0-10 scale): 0  Any medication changes, allergies to medications, adverse drug reactions, diagnosis change, or new procedure performed?: [x] No    [] Yes (see summary sheet for update)  Subjective functional status/changes:   [] No changes reported  \" I feel really good. I didn't go to cross fit. \"     OBJECTIVE      20 min Therapeutic Activity:  [x]  See flow sheet :   Rationale: increase ROM, increase strength and improve coordination  to improve the patients ability to perform pain free ADL\"s and return to workouts      20 min Neuromuscular Re-education:  [x]  See flow sheet :   Rationale: improve coordination, improve balance and increase proprioception  to improve the patients ability to perform pain free ADL's     10 min Manual Therapy:  Sternal mobilization with active pelvic tilt,  MARY infraclavicular rib pump with active breath   Obtained consent for hand placement , Functional STM to bilateral upper trap, levator scap and cervical paraspinals in supine.     Rationale: decrease pain, increase ROM, decrease trigger points and increase postural awareness to improve the patients ability to perform pain free ADL's     With   [] TE   [x] TA   [] neuro   [] other: Patient Education: [x] Review HEP    [] Progressed/Changed HEP based on:   [] positioning   [] body mechanics   [] transfers   [] heat/ice application    [] other:      Other Objective/Functional Measures:   MARY Special Tests (pre/post)  HGIR:                                                 Right: 90/90             Left: 90/90  Shoulder Flexion   Right: 180/180             Left: 180/180  Hip Add Drop Test:                           Right: +/-            Left:+/-  Trunk Rot                                           Right: 17/16    Left 15 /16  S     Pain Level (0-10 scale) post treatment: 0    ASSESSMENT/Changes in Function:   Pt reported not going to crossfit this week and feeling good this session. Noted HGIR cleared , however assymetrical  LTR. continues to focused on obliques facilitation. Patient will continue to benefit from skilled PT services to modify and progress therapeutic interventions, address functional mobility deficits, address ROM deficits, address strength deficits, analyze and address soft tissue restrictions, analyze and cue movement patterns, analyze and modify body mechanics/ergonomics, assess and modify postural abnormalities, address imbalance/dizziness and instruct in home and community integration to attain remaining goals. []  See Plan of Care  []  See progress note/recertification  []  See Discharge Summary         Progress towards goals / Updated goals:    1.  Pt will be independent with HEP to encourage prophylaxis. Eval:dispensed  Current:MET 8//3/20 per pt report, updated this session      Long Term Goals: LTG- To be accomplished in 10 visit(s):  1.  Pt will demonstrate ability to forward flex to ground and return to standing with good mechanics and without pain to increase tolerance to daily activities. .  Eval:12 in from ground, right lateralization  Current: Progressing 7/31/20 to toes     2.  Pt will improve trunk rotation to 15 in bilaterally in hooklying to indicate mobility needed for gait.   Eval:right 19 in            Left: 17 in   Current:  progressing 8/17/20 15 in bilaterally at end of session      3.  Pt will report being able to sit at work and work a full day without being disturbed by back pain  Eval:pain frequently during day, reviewed sitting postures at eval    current :pt reported no pain during the day today:progressing 8/19/20    4.  Pt will be able to lift overhead with good mechanics and without increased thoracic pain to allow participation in daily and recreational activities. Eval:pain in thoracic spine with MMT of shoulder abduction and scaption   Current:Progressing 8/17/20 - reviewed techniques for gym continues to be challenged with overhead press        PLAN  [x]  Upgrade activities as tolerated     [x]  Continue plan of care  []  Update interventions per flow sheet       []  Discharge due to:_  []  Other:_      Néstor Hamilton, PTA 8/19/2020  4:57 PM    No future appointments.

## 2020-08-24 ENCOUNTER — APPOINTMENT (OUTPATIENT)
Dept: PHYSICAL THERAPY | Age: 25
End: 2020-08-24
Payer: COMMERCIAL

## 2020-08-26 ENCOUNTER — APPOINTMENT (OUTPATIENT)
Dept: PHYSICAL THERAPY | Age: 25
End: 2020-08-26
Payer: COMMERCIAL

## 2020-08-31 ENCOUNTER — APPOINTMENT (OUTPATIENT)
Dept: PHYSICAL THERAPY | Age: 25
End: 2020-08-31
Payer: COMMERCIAL

## 2020-10-02 ENCOUNTER — HOSPITAL ENCOUNTER (OUTPATIENT)
Dept: PHYSICAL THERAPY | Age: 25
Discharge: HOME | End: 2020-10-02
Payer: COMMERCIAL

## 2020-10-02 PROCEDURE — 97530 THERAPEUTIC ACTIVITIES: CPT

## 2020-10-02 PROCEDURE — 97164 PT RE-EVAL EST PLAN CARE: CPT

## 2020-10-02 PROCEDURE — 97112 NEUROMUSCULAR REEDUCATION: CPT

## 2020-10-02 NOTE — PROGRESS NOTES
[facility] at the [address], [facility], [address] [facility]  Phone: [phone]      Fax:  [phone]    Progress Note  Patient name: Marco Robertson Start of Care: 2020   Referral source: Referred, MD Timbo : 1995               Medical Diagnosis: Low back pain [M54.5]    Onset Date:1-2 years ago               Treatment Diagnosis: Thoracic pain    Prior Hospitalization: see medical history Provider#: [id number]   Medications: Verified on Patient summary List    Comorbidities: none as per pt    Prior Level of Function: Increased in pain with prolonged sitting as with work, increased exercise (>3 days per week), overhead lifts                             Visits from Start of Care: 10    Missed Visits: 0    Progress Towards Goals:   1.  Pt will be independent with HEP to encourage prophylaxis. Eval:dispensed  Current:MET 8//3/20 per pt report, updated this session      Long Term Goals: LTG- To be accomplished in 10 visit(s):  1.  Pt will demonstrate ability to forward flex to ground and return to standing with good mechanics and without pain to increase tolerance to daily activities. .  Eval:12 in from ground, right lateralization  Current: Progressing 20 to toes     2.  Pt will improve trunk rotation to 15 in bilaterally in hooklying to indicate mobility needed for gait.   Eval:right 19 in            Left: 17 in   Current:  progressing 20 15 in bilaterally at end of session   Status 10/2/20: pre/post intervention Trunk Rot                                           Right: 16.5 in/15 in  Left 16 in  /15 in     3.  Pt will report being able to sit at work and work a full day without being disturbed by back pain  Eval:pain frequently during day, reviewed sitting postures at eval    current :pt reported no pain during the day yesterday :progressing 10/2/20     4.  Pt will be able to lift overhead with good mechanics and without increased thoracic pain to allow participation in daily and recreational activities. Eval:pain in thoracic spine with MMT of shoulder abduction and scaption   Current:Progressing 8/17/20 - reviewed techniques for gym continues to be challenged with overhead press  Status 10/2/20 pt has not been to CrossFit in 1 month previously pain with lifting overhead       Key Functional Changes:   Pt has not been seen in greater than 1 month due to pt travel and work schedule. Reports decreased back pain since starting PT but has also not been lifting. Previously had most pain with crossfit and lifting. Updated and reviewed with HEP. Discussed pt coming to PT 1 time per week as returns to running and increased activity level to address postural and mechanical imbalances. Updated Goals: to be achieved in 8 treatments:   Same as above   1. Pt will be able to start running program without onset of back pain to improve health quality of life. ASSESSMENT/RECOMMENDATIONS:  [x]Continue therapy per initial plan/protocol at a frequency of  8 treatments  []Continue therapy with the following recommended changes:_____________________      _____________________________________________________________________  []Discontinue therapy progressing towards or have reached established goals  []Discontinue therapy due to lack of appreciable progress towards goals  []Discontinue therapy due to lack of attendance or compliance  []Await Physician's recommendations/decisions regarding therapy  []Other:________________________________________________________________    Thank you for this referral.   Emma Patel, PT, DPT 10/2/2020 4:49 PM  NOTE TO PHYSICIAN:  PLEASE COMPLETE THE ORDERS BELOW AND   FAX TO [facility]: [phone]  If you are unable to process this request in 24 hours please contact our office: [phone]        []  I have read the above report and request that my patient continue as recommended.   []  I have read the above report and request that my patient continue therapy with the following changes/special instructions:________________________________________  []I have read the above report and request that my patient be discharged from therapy.     [de-identified] Signature:____________Date:_________TIME:________    [facility], Date and Time must be completed for valid certification **

## 2020-10-02 NOTE — PROGRESS NOTES
PT DAILY TREATMENT NOTE    Patient Name: Ray Kingsley  Date:10/2/2020  : 1995  [x]  Patient  Verified  Payor: BLUE CROSS / Plan: Estela Ceja [id number] PPO / Product Type: PPO /    In time:1:09 pm  Out time:1:59 pm   Total Treatment Time (min): 50  Total Timed Codes (min): 50  1:1 Treatment Time (MC only): 50   Visit #: 10 of 10    Treatment Area: Low back pain [M54.5]    SUBJECTIVE  Pain Level (0-10 scale): 0  Any medication changes, allergies to medications, adverse drug reactions, diagnosis change, or new procedure performed?: [x] No    [] Yes (see summary sheet for update)  Subjective functional status/changes:   [] No changes reported  \"Not too bad but I haven't been doing that much. \"    OBJECTIVE    25 min []Eval                  [x]Re-Eval       15 min Therapeutic Activity:  [x]  See flow sheet :   Rationale: increase ROM, increase strength, improve coordination and increase proprioception  to improve the patients ability to perform daily activities with decreased pain and symptom levels       10 min Neuromuscular Re-education:  []  See flow sheet :   Rationale: increase ROM, increase strength, improve coordination and increase proprioception  to improve the patients ability to perform daily activities with decreased pain and symptom levels         With   [] TE   [x] TA   [] neuro   [] other: Patient Education: [x] Review HEP    [] Progressed/Changed HEP based on:   [] positioning   [] body mechanics   [] transfers   [] heat/ice application    [] other:      Other Objective/Functional Measures:   MARY Special Tests (pre/post)  HGIR:                                                 Right: 90             Left: 90  Shoulder Flexion   Right: 145             Left: 151  Hip Add Drop Test:                           Right: +/-            Left:+/-  Trunk Rot                                           Right: 16.5 in/15 in Left 16 in  /15 in  Shoulder Horizontal Abduction          Right: [facility] Left: 31 /38     Passive SLR    Right: 64/70  Left: 46/65    Standing Forward Flexion: 4.5 in, right lateralization      Pain Level (0-10 scale) post treatment: 0    ASSESSMENT/Changes in Function:   Pt has not been seen in greater than 1 month due to pt travel and work schedule. Reports decreased back pain since starting PT but has also not been lifting. Previously had most pain with crossfit and lifting. Updated and reviewed with HEP. Discussed pt coming to PT 1 time per week as returns to running and increased activity level to address postural and mechanical imbalances. Patient will continue to benefit from skilled PT services to modify and progress therapeutic interventions, address functional mobility deficits, address ROM deficits, address strength deficits, analyze and address soft tissue restrictions, analyze and cue movement patterns, analyze and modify body mechanics/ergonomics, assess and modify postural abnormalities and instruct in home and community integration to attain remaining goals. []  See Plan of Care  []  See progress note/recertification  []  See Discharge Summary         Progress towards goals / Updated goals:     1.  Pt will be independent with HEP to encourage prophylaxis. Eval:dispensed  Current:MET 8//3/20 per pt report, updated this session      Long Term Goals: LTG- To be accomplished in 10 visit(s):  1.  Pt will demonstrate ability to forward flex to ground and return to standing with good mechanics and without pain to increase tolerance to daily activities. .  Eval:12 in from ground, right lateralization  Current: Progressing 7/31/20 to toes     2.  Pt will improve trunk rotation to 15 in bilaterally in hooklying to indicate mobility needed for gait.   Eval:right 19 in            Left: 17 in   Current:  progressing 8/17/20 15 in bilaterally at end of session   Status 10/2/20: pre/post intervention Trunk Rot                                           Right: 16.5 in/15 in Left 16 in  /15 in     3.  Pt will report being able to sit at work and work a full day without being disturbed by back pain  Eval:pain frequently during day, reviewed sitting postures at eval    current :pt reported no pain during the day yesterday :progressing 10/2/20     4.  Pt will be able to lift overhead with good mechanics and without increased thoracic pain to allow participation in daily and recreational activities. Eval:pain in thoracic spine with MMT of shoulder abduction and scaption   Current:Progressing 8/17/20 - reviewed techniques for gym continues to be challenged with overhead press  Status 10/2/20 pt has not been to Cross[employer] in 1 month previously pain with lifting overhead    PLAN  [x]  Upgrade activities as tolerated     []  Continue plan of care  []  Update interventions per flow sheet       []  Discharge due to:_  []  Other:_      Cam Body, PT, DPT 10/2/2020  1:13 PM    No future appointments.

## 2020-12-09 NOTE — PROGRESS NOTES
[facility]  [address] [place], [address]  Ph [phone]  Fx [phone]    Physical Therapy Discharge Summary    Patient Name: Robbie Lowe : 1995   Treatment/Medical Diagnosis: Low back pain [M54.5]   Referral Source: Danielle Serrato MD     Date of Initial Visit: 20 Attended Visits: 10 Missed Visits: 0       SUMMARY OF TREATMENT  Pt attended  initial evaluation and     9     follow-ups. He was reevaluated on 10/2/20 with a recommendation to continue 1x weekly for 8 visits however patient did not return. He notified our office on 20 that he wished to be discharged at this time as he is working out on his own. Therefore a formal reassessment of goals was not performed. RECOMMENDATIONS  Discontinue physical therapy due to patient not returning. If you have any questions/comments please contact us directly at [phone]. Thank you for allowing us to assist in the care of your patient.     Therapist Signature: Jose Guadalupe Richter, PT Date: 20     Time: 11:01 AM